# Patient Record
Sex: FEMALE | Race: WHITE | Employment: OTHER | URBAN - METROPOLITAN AREA
[De-identification: names, ages, dates, MRNs, and addresses within clinical notes are randomized per-mention and may not be internally consistent; named-entity substitution may affect disease eponyms.]

---

## 2017-08-15 ENCOUNTER — HOSPITAL ENCOUNTER (OUTPATIENT)
Dept: MAMMOGRAPHY | Age: 72
Discharge: HOME | End: 2017-08-15
Payer: MEDICARE

## 2017-08-15 DIAGNOSIS — Z12.39 SCREENING BREAST EXAMINATION: ICD-10-CM

## 2017-08-15 PROCEDURE — 77067 SCR MAMMO BI INCL CAD: CPT

## 2018-09-25 ENCOUNTER — HOSPITAL ENCOUNTER (OUTPATIENT)
Dept: MAMMOGRAPHY | Age: 73
Discharge: HOME | End: 2018-09-25
Payer: MEDICARE

## 2018-09-25 DIAGNOSIS — Z12.39 SCREENING BREAST EXAMINATION: ICD-10-CM

## 2018-09-25 PROCEDURE — 77063 BREAST TOMOSYNTHESIS BI: CPT

## 2019-10-08 ENCOUNTER — HOSPITAL ENCOUNTER (OUTPATIENT)
Dept: MAMMOGRAPHY | Age: 74
Discharge: HOME | End: 2019-10-08
Attending: FAMILY MEDICINE
Payer: MEDICARE

## 2019-10-08 DIAGNOSIS — Z12.31 VISIT FOR SCREENING MAMMOGRAM: ICD-10-CM

## 2019-10-08 PROCEDURE — 77063 BREAST TOMOSYNTHESIS BI: CPT

## 2020-10-01 ENCOUNTER — TRANSCRIBE ORDER (OUTPATIENT)
Dept: SCHEDULING | Age: 75
End: 2020-10-01

## 2020-10-01 DIAGNOSIS — Z12.31 VISIT FOR SCREENING MAMMOGRAM: Primary | ICD-10-CM

## 2020-10-13 ENCOUNTER — HOSPITAL ENCOUNTER (OUTPATIENT)
Dept: MAMMOGRAPHY | Age: 75
Discharge: HOME | End: 2020-10-13
Payer: MEDICARE

## 2020-10-13 DIAGNOSIS — Z12.31 VISIT FOR SCREENING MAMMOGRAM: ICD-10-CM

## 2020-10-13 PROCEDURE — 77063 BREAST TOMOSYNTHESIS BI: CPT

## 2021-10-04 ENCOUNTER — TRANSCRIBE ORDER (OUTPATIENT)
Dept: SCHEDULING | Age: 76
End: 2021-10-04

## 2021-10-04 DIAGNOSIS — Z12.31 SCREENING MAMMOGRAM FOR HIGH-RISK PATIENT: Primary | ICD-10-CM

## 2021-10-29 ENCOUNTER — HOSPITAL ENCOUNTER (OUTPATIENT)
Dept: MAMMOGRAPHY | Age: 76
Discharge: HOME | End: 2021-10-29
Attending: FAMILY MEDICINE
Payer: MEDICARE

## 2021-10-29 DIAGNOSIS — Z12.31 SCREENING MAMMOGRAM FOR HIGH-RISK PATIENT: ICD-10-CM

## 2021-10-29 PROCEDURE — 77063 BREAST TOMOSYNTHESIS BI: CPT

## 2022-10-07 ENCOUNTER — TRANSCRIBE ORDER (OUTPATIENT)
Dept: SCHEDULING | Age: 77
End: 2022-10-07

## 2022-10-07 DIAGNOSIS — Z12.31 SCREENING MAMMOGRAM FOR HIGH-RISK PATIENT: Primary | ICD-10-CM

## 2022-10-31 ENCOUNTER — HOSPITAL ENCOUNTER (OUTPATIENT)
Dept: MAMMOGRAPHY | Age: 77
Discharge: HOME | End: 2022-10-31
Attending: FAMILY MEDICINE
Payer: MEDICARE

## 2022-10-31 DIAGNOSIS — Z12.31 SCREENING MAMMOGRAM FOR HIGH-RISK PATIENT: ICD-10-CM

## 2022-10-31 PROCEDURE — 77063 BREAST TOMOSYNTHESIS BI: CPT

## 2023-10-20 ENCOUNTER — HOSPITAL ENCOUNTER (EMERGENCY)
Facility: HOSPITAL | Age: 78
Discharge: HOME | End: 2023-10-20
Attending: EMERGENCY MEDICINE
Payer: MEDICARE

## 2023-10-20 VITALS
RESPIRATION RATE: 16 BRPM | SYSTOLIC BLOOD PRESSURE: 117 MMHG | TEMPERATURE: 98.4 F | WEIGHT: 225 LBS | HEIGHT: 61 IN | DIASTOLIC BLOOD PRESSURE: 79 MMHG | HEART RATE: 78 BPM | BODY MASS INDEX: 42.48 KG/M2 | OXYGEN SATURATION: 94 %

## 2023-10-20 DIAGNOSIS — R30.0 DYSURIA: ICD-10-CM

## 2023-10-20 DIAGNOSIS — N30.00 ACUTE CYSTITIS WITHOUT HEMATURIA: Primary | ICD-10-CM

## 2023-10-20 DIAGNOSIS — R39.15 URGENCY OF URINATION: ICD-10-CM

## 2023-10-20 LAB
APPEARANCE UR: ABNORMAL
BACTERIA URNS QL MICRO: ABNORMAL /HPF
BILIRUB UR QL: NEGATIVE
COLOR UR: ABNORMAL
EPITH CASTS URNS QL MICRO: ABNORMAL /LPF
GLUCOSE UR STRIP.AUTO-MCNC: NEGATIVE MG/DL
HGB UR QL STRIP: ABNORMAL
KETONES UR QL STRIP.AUTO: NEGATIVE MG/DL
LEUKOCYTE ESTERASE UR QL STRIP.AUTO: ABNORMAL
NITRITE UR QL STRIP.AUTO: NEGATIVE
PH UR STRIP: 6.5 (ref 5–8)
PROT UR STRIP-MCNC: NEGATIVE MG/DL
RBC #/AREA URNS HPF: ABNORMAL /HPF
SP GR UR REFRACTOMETRY: 1.01 (ref 1–1.03)
URINE CULTURE IF INDICATED: ABNORMAL
UROBILINOGEN UR QL STRIP.AUTO: 0.2 EU/DL (ref 0.2–1)
WBC URNS QL MICRO: >100 /HPF (ref 0–4)

## 2023-10-20 PROCEDURE — 81001 URINALYSIS AUTO W/SCOPE: CPT

## 2023-10-20 PROCEDURE — 87086 URINE CULTURE/COLONY COUNT: CPT

## 2023-10-20 PROCEDURE — 99283 EMERGENCY DEPT VISIT LOW MDM: CPT

## 2023-10-20 RX ORDER — CEFPODOXIME PROXETIL 200 MG/1
200 TABLET, FILM COATED ORAL 2 TIMES DAILY
Qty: 14 TABLET | Refills: 0 | Status: SHIPPED | OUTPATIENT
Start: 2023-10-20 | End: 2023-10-27

## 2023-10-20 RX ORDER — ERYTHROMYCIN 5 MG/G
0.5 OINTMENT OPHTHALMIC NIGHTLY
Qty: 3.5 G | Refills: 0 | Status: SHIPPED | OUTPATIENT
Start: 2023-10-20 | End: 2023-10-20

## 2023-10-20 ASSESSMENT — ENCOUNTER SYMPTOMS
DIARRHEA: 0
SORE THROAT: 0
VOMITING: 0
COUGH: 0
NAUSEA: 0
CONSTIPATION: 0
ABDOMINAL PAIN: 0
ABDOMINAL DISTENTION: 0
SHORTNESS OF BREATH: 0
WHEEZING: 0

## 2023-10-20 ASSESSMENT — PAIN - FUNCTIONAL ASSESSMENT: PAIN_FUNCTIONAL_ASSESSMENT: 0-10

## 2023-10-20 ASSESSMENT — PAIN DESCRIPTION - ORIENTATION: ORIENTATION: LOWER

## 2023-10-20 ASSESSMENT — PAIN DESCRIPTION - LOCATION: LOCATION: ABDOMEN

## 2023-10-20 ASSESSMENT — PAIN SCALES - GENERAL: PAINLEVEL_OUTOF10: 2

## 2023-10-20 NOTE — ED TRIAGE NOTES
Pt reports a UTI that started on 10/12/23, had a 5 day course of antibiotics but began to feel bad again once they were done. Pt c/o burning and urgency, lower abdominal pain.

## 2023-10-20 NOTE — ED PROVIDER NOTES
[facility] & [facility] EMERGENCY DEPT  EMERGENCY DEPARTMENT ENCOUNTER      Pt Name: Hansa rEvin  MRN: [id number]  [address] [facility] 1945  Date of evaluation: 10/20/2023  Provider: Sriram Ivan MD    [address]       Chief Complaint   Patient presents with    Dysuria         HISTORY OF PRESENT ILLNESS   (Location/Symptom, Timing/Onset, Context/Setting, Quality, Duration, Modifying Factors, Severity)  Note limiting factors. 70-year-old female with a past medical history of ulcerative colitis, hypothyroidism, hypertension, HLD presenting to the emergency department with less than 1 day history of dysuria and urgency. Patient stated that she started having these same symptoms on 10/11 but then went on a cruise and then diagnosed on 10/2 with a urinary tract infection and was given a 5-day course of nitric serotonin. Patient states that she took these medications and symptoms returned morning of 10/20. She denies any fevers, chills, nausea, vomiting, diarrhea, constipation, hematuria, or other changes in bowel or bladder function. She has had a urinary tract infection in the past, however this was approximately 10 years ago. Other kidney history includes kidney renal cell carcinoma in 2021 that was surgically resected. No recurrence per patient. The history is provided by the patient. 70-year-old female with history of ulcerative colitis, hypothyroidism, hypertension, and previous history of renal cell, presenting to the ED emergency department with less than 1 day history of dysuria and urgency      Review of External Medical Records:     Nursing Notes were reviewed. REVIEW OF SYSTEMS    (2-9 systems for level 4, 10 or more for level 5)     Review of Systems   Constitutional:  Negative for chills and fever. HENT:  Negative for congestion and sore throat. Eyes:  Negative for visual disturbance. Respiratory:  Negative for cough, shortness of breath and wheezing.     Cardiovascular:  Negative for

## 2023-10-22 LAB
BACTERIA SPEC CULT: ABNORMAL
CC UR VC: ABNORMAL
SERVICE CMNT-IMP: ABNORMAL

## 2023-10-23 LAB
BACTERIA SPEC CULT: ABNORMAL
CC UR VC: ABNORMAL
SERVICE CMNT-IMP: ABNORMAL

## 2023-12-16 ENCOUNTER — APPOINTMENT (OUTPATIENT)
Facility: HOSPITAL | Age: 78
End: 2023-12-16
Payer: MEDICARE

## 2023-12-16 ENCOUNTER — HOSPITAL ENCOUNTER (EMERGENCY)
Facility: HOSPITAL | Age: 78
Discharge: HOME | End: 2023-12-16
Attending: STUDENT IN AN ORGANIZED HEALTH CARE EDUCATION/TRAINING PROGRAM
Payer: MEDICARE

## 2023-12-16 VITALS
SYSTOLIC BLOOD PRESSURE: 143 MMHG | WEIGHT: 224 LBS | BODY MASS INDEX: 41.22 KG/M2 | HEIGHT: 62 IN | OXYGEN SATURATION: 96 % | RESPIRATION RATE: 18 BRPM | TEMPERATURE: 98.1 F | HEART RATE: 85 BPM | DIASTOLIC BLOOD PRESSURE: 80 MMHG

## 2023-12-16 DIAGNOSIS — J06.9 ACUTE UPPER RESPIRATORY INFECTION: Primary | ICD-10-CM

## 2023-12-16 DIAGNOSIS — J02.9 ACUTE PHARYNGITIS, UNSPECIFIED ETIOLOGY: ICD-10-CM

## 2023-12-16 DIAGNOSIS — J04.0 LARYNGITIS: ICD-10-CM

## 2023-12-16 LAB
DEPRECATED S PYO AG THROAT QL EIA: NEGATIVE
SARS-COV-2 RDRP RESP QL NAA+PROBE: NOT DETECTED
SOURCE: NORMAL

## 2023-12-16 PROCEDURE — 71046 X-RAY EXAM CHEST 2 VIEWS: CPT

## 2023-12-16 PROCEDURE — 87635 SARS-COV-2 COVID-19 AMP PRB: CPT

## 2023-12-16 PROCEDURE — 87070 CULTURE OTHR SPECIMN AEROBIC: CPT

## 2023-12-16 PROCEDURE — 87880 STREP A ASSAY W/OPTIC: CPT

## 2023-12-16 RX ORDER — METHYLPREDNISOLONE 4 MG/1
TABLET ORAL
Qty: 1 KIT | Refills: 0 | Status: SHIPPED | OUTPATIENT
Start: 2023-12-16 | End: 2023-12-22

## 2023-12-16 RX ORDER — ALBUTEROL SULFATE 90 UG/1
2 AEROSOL, METERED RESPIRATORY (INHALATION) 4 TIMES DAILY PRN
Qty: 18 G | Refills: 0 | Status: SHIPPED | OUTPATIENT
Start: 2023-12-16

## 2023-12-16 RX ORDER — DEXAMETHASONE SODIUM PHOSPHATE 10 MG/ML
10 INJECTION, SOLUTION INTRAMUSCULAR; INTRAVENOUS
Status: DISCONTINUED | OUTPATIENT
Start: 2023-12-16 | End: 2023-12-16 | Stop reason: HOSPADM

## 2023-12-16 ASSESSMENT — LIFESTYLE VARIABLES
HOW MANY STANDARD DRINKS CONTAINING ALCOHOL DO YOU HAVE ON A TYPICAL DAY: 1 OR 2
HOW OFTEN DO YOU HAVE A DRINK CONTAINING ALCOHOL: MONTHLY OR LESS

## 2023-12-16 ASSESSMENT — PAIN DESCRIPTION - LOCATION: LOCATION: THROAT

## 2023-12-16 ASSESSMENT — PAIN - FUNCTIONAL ASSESSMENT: PAIN_FUNCTIONAL_ASSESSMENT: 0-10

## 2023-12-16 ASSESSMENT — PAIN SCALES - GENERAL: PAINLEVEL_OUTOF10: 3

## 2023-12-16 NOTE — DISCHARGE INSTRUCTIONS
Your your chest x-ray is normal, your strep test is negative, your rapid COVID test is also negative. We do send off strep testing for throat cultures which should result within the next 48 to 72 hours. If for any reason you should be placed on antibiotics as a result of these findings, you will receive a phone call from another provider. We encourage you to drink things like hot tea and honey to help relieve the sore throat as well as laryngitis. We are going to place you on a course of steroids to see if that can also help manage the inflammation associated with your upper respiratory illness. Take Mucinex as needed for nasal secretions, we are also providing a prescription for an inhaler. Follow-up with your primary care provider next week if you do not have any improvement of symptoms.   Return to the ER for any worsening or worrisome concerns

## 2023-12-16 NOTE — ED NOTES
Discharge instructions reviewed. Detailed written discharge information provided. Patient stable and ambulatory upon discharge.      Ella Zhang RN  12/16/23 [id number]

## 2023-12-16 NOTE — ED PROVIDER NOTES
[facility] & [facility] EMERGENCY DEPT  EMERGENCY DEPARTMENT ENCOUNTER      Pt Name: Vicente Iraheta  MRN: [id number]  [address] [facility] [id number]  Date of evaluation: 12/16/2023  Provider: DEBBY Peterson NP    CHIEF COMPLAINT       Chief Complaint   Patient presents with    Pharyngitis         HISTORY OF PRESENT ILLNESS   (Location/Symptom, Timing/Onset, Context/Setting, Quality, Duration, Modifying Factors, Severity)  Note limiting factors. The history is provided by the patient. Vicente Iraheta is a 70 y.o. female with Hx of GERD, OA, BCC, UC, HTN, hypothyroidism who presents ambulatory by herself to [facility] ED with cc of sore throat. Patient reports that she has had a worsening sore throat, laryngitis, unproductive cough with associated wheezing since Wednesday of this week. Her symptoms started after she flew on a plane and went to a [employer] in [facility]. No medication taken prior to arrival for symptoms. She is not having any difficulty swallowing or breathing. She denies any history of fevers. She states that she does have a history of wheezing in the past and has a nebulizer at home, but no inhaler. Denies F/C, N/V/D, CP, SOB, dysuria, urinary frequency/hesitancy, flank pain. Denies tobacco/ ETOH/ substance abuse. PCP: Renita Rodney MD    There are no other complaints, changes or physical findings at this time. Review of External Medical Records:     Nursing Notes were reviewed. REVIEW OF SYSTEMS    (2-9 systems for level 4, 10 or more for level 5)     Review of Systems   Constitutional:  Negative for activity change, appetite change and fever. HENT:  Positive for congestion, rhinorrhea, sore throat and voice change. Respiratory:  Positive for cough and wheezing. Negative for shortness of breath. Cardiovascular:  Negative for chest pain. Gastrointestinal:  Negative for abdominal pain, diarrhea, nausea and vomiting.    Genitourinary:  Negative for difficulty urinating. Musculoskeletal:  Negative for arthralgias and myalgias. Skin:  Negative for rash. Neurological:  Negative for weakness and headaches. Except as noted above the remainder of the review of systems was reviewed and negative. PAST MEDICAL HISTORY     Past Medical History:   Diagnosis Date    Arthritis     Cancer ([address]) 2000    BCC nose    GERD (gastroesophageal reflux disease)     Hypertension     Obesity     Thyroid disease     hypothyroidism    Ulcerative colitis ([address] [facility])          SURGICAL HISTORY       Past Surgical History:   Procedure Laterality Date    CHOLECYSTECTOMY  2007    HEENT  2000    exc BCC nose    TONSILLECTOMY  child    TOTAL KNEE ARTHROPLASTY Left 2011         CURRENT MEDICATIONS       Discharge Medication List as of 12/16/2023 12:40 PM        CONTINUE these medications which have NOT CHANGED    Details   ferrous sulfate (IRON 325) 325 (65 Fe) MG tablet Take 325 mg by mouth 2 times daily (with meals)Historical Med      HYDROmorphone (DILAUDID) 2 MG tablet Take 2-4 mg by mouth every 4 hours as needed. Historical Med      levothyroxine (SYNTHROID) 112 MCG tablet Take 112 mcg by mouth every eveningHistorical Med      lisinopril-hydroCHLOROthiazide (PRINZIDE;ZESTORETIC) 10-12.5 MG per tablet Take 1 tablet by mouth every eveningHistorical Med      mesalamine (APRISO) 0.375 g extended release capsule Take 2 capsules by mouth 2 times dailyHistorical Med      omeprazole (PRILOSEC) 20 MG delayed release capsule Take 20 mg by mouth dailyHistorical Med      raloxifene (EVISTA) 60 MG tablet Take 60 mg by mouth dailyHistorical Med      senna-docusate (PERICOLACE) 8.6-50 MG per tablet Take 1 tablet by mouth daily as neededHistorical Med      warfarin (COUMADIN) 2 MG tablet Take 2.5 tablets by mouth everyday at [facility] or as directed by your doctor. Historical Med             ALLERGIES     Shellfish allergy and Hexachlorophene    FAMILY HISTORY       Family History   Problem Relation

## 2023-12-17 LAB
BACTERIA SPEC CULT: NORMAL
SERVICE CMNT-IMP: NORMAL

## 2024-01-02 ENCOUNTER — TRANSCRIBE ORDERS (OUTPATIENT)
Facility: HOSPITAL | Age: 79
End: 2024-01-02

## 2024-01-02 DIAGNOSIS — Z12.31 VISIT FOR SCREENING MAMMOGRAM: Primary | ICD-10-CM

## 2024-01-10 ENCOUNTER — HOSPITAL ENCOUNTER (OUTPATIENT)
Facility: HOSPITAL | Age: 79
Discharge: HOME | End: 2024-01-13
Attending: FAMILY MEDICINE
Payer: MEDICARE

## 2024-01-10 DIAGNOSIS — Z12.31 VISIT FOR SCREENING MAMMOGRAM: ICD-10-CM

## 2024-01-10 PROCEDURE — 77063 BREAST TOMOSYNTHESIS BI: CPT

## 2024-08-26 ENCOUNTER — TRANSCRIBE ORDERS (OUTPATIENT)
Dept: SCHEDULING | Age: 79
End: 2024-08-26

## 2024-08-26 DIAGNOSIS — R06.00 DYSPNEA, UNSPECIFIED TYPE: Primary | ICD-10-CM

## 2024-11-27 ENCOUNTER — HOSPITAL ENCOUNTER (OUTPATIENT)
Facility: HOSPITAL | Age: 79
Discharge: HOME | End: 2024-11-29
Payer: MEDICARE

## 2024-11-27 ENCOUNTER — HOSPITAL ENCOUNTER (OUTPATIENT)
Facility: HOSPITAL | Age: 79
Discharge: HOME | End: 2024-11-30
Payer: MEDICARE

## 2024-11-27 VITALS — BODY MASS INDEX: 40.97 KG/M2 | WEIGHT: 224 LBS

## 2024-11-27 DIAGNOSIS — R06.00 DYSPNEA, UNSPECIFIED TYPE: ICD-10-CM

## 2024-11-27 PROCEDURE — 6360000004 HC RX CONTRAST MEDICATION: Performed by: INTERNAL MEDICINE

## 2024-11-27 PROCEDURE — A9579 GAD-BASE MR CONTRAST NOS,1ML: HCPCS | Performed by: INTERNAL MEDICINE

## 2024-11-27 PROCEDURE — 75561 CARDIAC MRI FOR MORPH W/DYE: CPT

## 2024-11-27 PROCEDURE — 75561 CARDIAC MRI FOR MORPH W/DYE: CPT | Performed by: INTERNAL MEDICINE

## 2024-11-27 RX ORDER — REGADENOSON 0.08 MG/ML
0.4 INJECTION, SOLUTION INTRAVENOUS
Status: DISCONTINUED | OUTPATIENT
Start: 2024-11-27 | End: 2024-11-27

## 2024-11-27 RX ADMIN — GADOTERIDOL 30 ML: 279.3 INJECTION, SOLUTION INTRAVENOUS at 12:00

## 2025-01-09 ENCOUNTER — TRANSCRIBE ORDERS (OUTPATIENT)
Facility: HOSPITAL | Age: 80
End: 2025-01-09

## 2025-01-09 DIAGNOSIS — Z12.31 BREAST CANCER SCREENING BY MAMMOGRAM: Primary | ICD-10-CM

## 2025-01-13 ENCOUNTER — HOSPITAL ENCOUNTER (OUTPATIENT)
Facility: HOSPITAL | Age: 80
Discharge: HOME | End: 2025-01-16
Attending: FAMILY MEDICINE
Payer: MEDICARE

## 2025-01-13 DIAGNOSIS — Z12.31 BREAST CANCER SCREENING BY MAMMOGRAM: ICD-10-CM

## 2025-01-13 PROCEDURE — 77063 BREAST TOMOSYNTHESIS BI: CPT
